# Patient Record
Sex: FEMALE
[De-identification: names, ages, dates, MRNs, and addresses within clinical notes are randomized per-mention and may not be internally consistent; named-entity substitution may affect disease eponyms.]

---

## 2018-01-16 ENCOUNTER — HOSPITAL ENCOUNTER (INPATIENT)
Dept: HOSPITAL 92 - ERS | Age: 58
LOS: 2 days | Discharge: PSYCHIATRIC OR SUBSTANCE USE FACILITY | DRG: 918 | End: 2018-01-18
Attending: INTERNAL MEDICINE | Admitting: INTERNAL MEDICINE
Payer: COMMERCIAL

## 2018-01-16 VITALS — BODY MASS INDEX: 28.5 KG/M2

## 2018-01-16 DIAGNOSIS — M32.9: ICD-10-CM

## 2018-01-16 DIAGNOSIS — I45.81: ICD-10-CM

## 2018-01-16 DIAGNOSIS — E03.9: ICD-10-CM

## 2018-01-16 DIAGNOSIS — B96.1: ICD-10-CM

## 2018-01-16 DIAGNOSIS — Z88.8: ICD-10-CM

## 2018-01-16 DIAGNOSIS — Z92.21: ICD-10-CM

## 2018-01-16 DIAGNOSIS — Z88.6: ICD-10-CM

## 2018-01-16 DIAGNOSIS — N39.0: ICD-10-CM

## 2018-01-16 DIAGNOSIS — Z85.3: ICD-10-CM

## 2018-01-16 DIAGNOSIS — F25.9: ICD-10-CM

## 2018-01-16 DIAGNOSIS — Z88.1: ICD-10-CM

## 2018-01-16 DIAGNOSIS — G89.4: ICD-10-CM

## 2018-01-16 DIAGNOSIS — F17.210: ICD-10-CM

## 2018-01-16 DIAGNOSIS — Z90.13: ICD-10-CM

## 2018-01-16 DIAGNOSIS — T40.2X2A: Primary | ICD-10-CM

## 2018-01-16 LAB
ALBUMIN SERPL BCG-MCNC: 4.2 G/DL (ref 3.5–5)
ALP SERPL-CCNC: 134 U/L (ref 40–150)
ALT SERPL W P-5'-P-CCNC: 10 U/L (ref 8–55)
ANALYZER IN CARDIO: (no result)
ANION GAP SERPL CALC-SCNC: 16 MMOL/L (ref 10–20)
ANISOCYTOSIS BLD QL SMEAR: (no result) (100X)
APAP SERPL-MCNC: (no result) MCG/ML (ref 10–30)
AST SERPL-CCNC: 11 U/L (ref 5–34)
BACTERIA UR QL AUTO: (no result) HPF
BASE EXCESS STD BLDA CALC-SCNC: 0.4 MEQ/L
BASOPHILS # BLD AUTO: 0.1 THOU/UL (ref 0–0.2)
BASOPHILS NFR BLD AUTO: 0.5 % (ref 0–1)
BILIRUB SERPL-MCNC: 0.6 MG/DL (ref 0.2–1.2)
BUN SERPL-MCNC: 11 MG/DL (ref 9.8–20.1)
CA-I BLDA-SCNC: 1.2 MMOL/L (ref 1.12–1.3)
CALCIUM SERPL-MCNC: 10.3 MG/DL (ref 7.8–10.44)
CHLORIDE SERPL-SCNC: 101 MMOL/L (ref 98–107)
CO2 SERPL-SCNC: 22 MMOL/L (ref 22–29)
CREAT CL PREDICTED SERPL C-G-VRATE: 0 ML/MIN (ref 70–130)
CRYSTAL-AUWI FLAG: 1.3 (ref 0–15)
CRYSTALS URNS QL MICRO: (no result) HPF
DRUG SCREEN CUTOFF: (no result)
EOSINOPHIL # BLD AUTO: 0.7 THOU/UL (ref 0–0.7)
EOSINOPHIL NFR BLD AUTO: 5.5 % (ref 0–10)
GLOBULIN SER CALC-MCNC: 3.3 G/DL (ref 2.4–3.5)
GLUCOSE SERPL-MCNC: 102 MG/DL (ref 70–105)
HCO3 BLDA-SCNC: 24 MEQ/L (ref 22–26)
HCT VFR BLDA CALC: 39.3 % (ref 36–47)
HEV IGM SER QL: 7 (ref 0–7.99)
HGB BLD-MCNC: 13.5 G/DL (ref 12–16)
HGB BLDA-MCNC: 11.7 G/DL (ref 12–16)
HYALINE CASTS #/AREA URNS LPF: (no result) LPF
LIPASE SERPL-CCNC: 7 U/L (ref 8–78)
LYMPHOCYTES # BLD: 3 THOU/UL (ref 1.2–3.4)
LYMPHOCYTES NFR BLD AUTO: 24.4 % (ref 21–51)
MCH RBC QN AUTO: 24.4 PG (ref 27–31)
MCV RBC AUTO: 74.4 FL (ref 81–99)
MDIFF COMPLETE?: YES
MEDTOX CONTROL LINE VALID?: (no result)
MEDTOX READER #: (no result)
MICROCYTES BLD QL SMEAR: (no result) (100X)
MONOCYTES # BLD AUTO: 0.8 THOU/UL (ref 0.11–0.59)
MONOCYTES NFR BLD AUTO: 6.4 % (ref 0–10)
NEUTROPHILS # BLD AUTO: 7.9 THOU/UL (ref 1.4–6.5)
NEUTROPHILS NFR BLD AUTO: 63.4 % (ref 42–75)
O2 A-A PPRESDIFF RESPIRATORY: 2.1 MM[HG] (ref 0–20)
OVALOCYTES BLD QL SMEAR: (no result) (100X)
PATHC CAST-AUWI FLAG: 5.14 (ref 0–2.49)
PCO2 BLDA: 35.3 MMHG (ref 35–45)
PH BLDA: 7.45 [PH] (ref 7.35–7.45)
PLATELET # BLD AUTO: 365 THOU/UL (ref 130–400)
PLATELET BLD QL SMEAR: (no result)
PO2 BLDA: 103.5 MMHG (ref 80–100)
POTASSIUM SERPL-SCNC: 3.8 MMOL/L (ref 3.5–5.1)
PROT UR STRIP.AUTO-MCNC: 30 MG/DL
RBC # BLD AUTO: 5.53 MILL/UL (ref 4.2–5.4)
RBC UR QL AUTO: (no result) HPF (ref 0–3)
SALICYLATES SERPL-MCNC: (no result) MG/DL (ref 15–30)
SODIUM SERPL-SCNC: 135 MMOL/L (ref 136–145)
SP GR UR STRIP: 1.03 (ref 1–1.04)
SPECIMEN DRAWN FROM PATIENT: (no result)
SPERM-AUWI FLAG: 0 (ref 0–9.9)
TARGETS BLD QL SMEAR: (no result) (100X)
TROPONIN I SERPL DL<=0.01 NG/ML-MCNC: (no result) NG/ML (ref ?–0.03)
TROPONIN I SERPL DL<=0.01 NG/ML-MCNC: (no result) NG/ML (ref ?–0.03)
WBC # BLD AUTO: 12.5 THOU/UL (ref 4.8–10.8)
WBC UR QL AUTO: (no result) HPF (ref 0–3)
YEAST-AUWI FLAG: 0 (ref 0–25)

## 2018-01-16 PROCEDURE — 83690 ASSAY OF LIPASE: CPT

## 2018-01-16 PROCEDURE — 80307 DRUG TEST PRSMV CHEM ANLYZR: CPT

## 2018-01-16 PROCEDURE — 80048 BASIC METABOLIC PNL TOTAL CA: CPT

## 2018-01-16 PROCEDURE — 84484 ASSAY OF TROPONIN QUANT: CPT

## 2018-01-16 PROCEDURE — 87186 SC STD MICRODIL/AGAR DIL: CPT

## 2018-01-16 PROCEDURE — 87077 CULTURE AEROBIC IDENTIFY: CPT

## 2018-01-16 PROCEDURE — 87086 URINE CULTURE/COLONY COUNT: CPT

## 2018-01-16 PROCEDURE — 96360 HYDRATION IV INFUSION INIT: CPT

## 2018-01-16 PROCEDURE — 80306 DRUG TEST PRSMV INSTRMNT: CPT

## 2018-01-16 PROCEDURE — 80053 COMPREHEN METABOLIC PANEL: CPT

## 2018-01-16 PROCEDURE — 85025 COMPLETE CBC W/AUTO DIFF WBC: CPT

## 2018-01-16 PROCEDURE — S0028 INJECTION, FAMOTIDINE, 20 MG: HCPCS

## 2018-01-16 PROCEDURE — 82805 BLOOD GASES W/O2 SATURATION: CPT

## 2018-01-16 PROCEDURE — 82140 ASSAY OF AMMONIA: CPT

## 2018-01-16 PROCEDURE — 36415 COLL VENOUS BLD VENIPUNCTURE: CPT

## 2018-01-16 PROCEDURE — 87149 DNA/RNA DIRECT PROBE: CPT

## 2018-01-16 PROCEDURE — 87040 BLOOD CULTURE FOR BACTERIA: CPT

## 2018-01-16 PROCEDURE — 51701 INSERT BLADDER CATHETER: CPT

## 2018-01-16 PROCEDURE — 81003 URINALYSIS AUTO W/O SCOPE: CPT

## 2018-01-16 PROCEDURE — 70450 CT HEAD/BRAIN W/O DYE: CPT

## 2018-01-16 PROCEDURE — 93005 ELECTROCARDIOGRAM TRACING: CPT

## 2018-01-16 PROCEDURE — 84443 ASSAY THYROID STIM HORMONE: CPT

## 2018-01-16 PROCEDURE — 71045 X-RAY EXAM CHEST 1 VIEW: CPT

## 2018-01-16 PROCEDURE — A4216 STERILE WATER/SALINE, 10 ML: HCPCS

## 2018-01-16 PROCEDURE — 81015 MICROSCOPIC EXAM OF URINE: CPT

## 2018-01-16 PROCEDURE — 74018 RADEX ABDOMEN 1 VIEW: CPT

## 2018-01-16 PROCEDURE — 93010 ELECTROCARDIOGRAM REPORT: CPT

## 2018-01-16 RX ADMIN — FAMOTIDINE SCH MG: 10 INJECTION, SOLUTION INTRAVENOUS at 20:17

## 2018-01-16 RX ADMIN — Medication SCH: at 20:22

## 2018-01-16 NOTE — RAD
ABDOMINAL RADIOGRAPH

SINGLE VIEW

1/16/18

 

CLINICAL HISTORY: 

Altered mental status. 

 

FINDINGS:  

There is distended air filled bowel of the imaged abdomen. Mild bowel gas is seen within the pelvis. 
Rounded density of the pelvis likely relates to a moderately distended urinary bladder, although is n
onspecific. There are punctate densities of the pelvis indicating phleboliths. Metallic clips are see
n at the right upper quadrant. No acute findings of the osseous structures are evident. 

 

IMPRESSION:  

Nonspecific gas filled bowel of the abdomen. Correlate clinically and as necessary, imaging followup 
may be obtained. 

 

POS: [facility]

## 2018-01-16 NOTE — CT
NONCONTRAST CT HEAD

1/16/18

 

HISTORY: 

Altered mental status. 

 

COMPARISON:  

4/3/14.

 

FINDINGS:  

There is no evidence of hemorrhage, acute infarction, mass effect, or midline shift. Ventricular syst
em is normal in size, shape, and position. 

 

There is mucosal thickening now present in the right sphenoid sinus and to a lesser degree the right 
maxillary antrum with minimal mucosal thickening in a few ethmoidal air cells bilaterally. The mastoi
d air cells are clear. Calvarial structures remain intact.

 

IMPRESSION:  

1.      No acute intracranial abnormalities demonstrated.

2.      Sinus disease.

 

POS: [facility]

## 2018-01-16 NOTE — HP
DATE OF ADMISSION:  2018

 

PRIMARY CARE PHYSICIAN:  Halie Gupta MD

 

CHIEF COMPLAINT:  Somnolence.

 

HISTORY OF PRESENT ILLNESS:  Ms. Chambers is a 57-year-old female with past medical history of hypothy
roidism, and breast cancer, status post chemotherapy and Surgery as well as history of bipolar disord
er and depression, who presented to the emergency room for somnolence.  History is mainly obtained by
 the record review and discussion with the ER physician.  The patient is very somnolent and altered a
nd is not able to provide any history and there is no family member available at bedside.

 

According to the ER physician, the patient was brought in by EMS with the , called 911.  He re
ported earlier when he was present in the ER that recently she had prescriptions filled for hydrocodo
ne on 2017 and now they are all gone.  There is a possibility that the patient took a lot of pa
in medications today.  When the  returned home from work today, he found the patient on the fl
oor.  Otherwise, there is no mention of any recent illnesses.  The patient has history of suicidal at
[facility]empt in 2016, but she denies any suicidal ideation when woke her up today.

 

Upon presentation to the emergency room, she was hemodynamically stable.  Her oxygen saturation was 9
9% on room air, her blood pressure was 154/86.  The emergency room physician contacted [facility] because of possibility of acetaminophen toxicity.  She received 3 bags of Mucomyst, but l
ater her toxicology, report came back with Tylenol level less than 6, so it was discontinued.  Her ur
ine drug screen did test positive for propoxyphene, tricyclics, benzodiazepines, and cocaine.  The pa
tient denied any cocaine use to the ER physician and myself.

 

Nevertheless, the patient is more awake now, but still somewhat confused.  She is able to protect her
 airways and is now being admitted for multiple drug overdose, unsure if it is intentional or acciden
latesha.  The EKG done in the emergency room shows QT interval less than 500 milliseconds and shows comfort
l sinus rhythm.  Because of presentation with altered mental status, she did undergo a CT scan of the
 brain which is unremarkable.  She also had an abdominal x-ray and a chest x-ray, which were also unr
emarkable.  She is currently hemodynamically stable and is admitted to telemetry for altered mental s
tatus and drug overdose.

 

PAST MEDICAL HISTORY:  As per the medical records;

1.  SLE.

2.  Hypothyroidism.

3.  Breast carcinoma, status post chemotherapy and mastectomy.

4.  Chronic pain syndrome.

5.  Anxiety.

6.  Bipolar.

7.  Schizoaffective disorder.

8.  Depression.

 

PAST SURGICAL HISTORY:

1.  Appendectomy.

2.  Bilateral mastectomy with breast reconstruction.

3.  Hysterectomy.

4.  Cholecystectomy.

5.   section.

 

ALLERGIES:  Listed as;

1.  CEPHALOSPORINS.

2.  NUBAIN.

3.  REGLAN.

4.  TORADOL.

5.  ZOFRAN.

 

FAMILY HISTORY:  Unable to obtain as the patient is very altered at this time.

 

SOCIAL HISTORY:  The patient is  and lives with her family.  She smokes about half to 1 pack o
f cigarettes per day.  No history of drug or alcohol abuse.  Chronic pain medication, narcotic use.

 

CURRENT MEDICATIONS:  Unable to obtain.

 

There is no family in the room and the patient is not able to tell me which medications she takes.

 

REVIEW OF SYSTEMS:  Unable to obtain because of the patient's altered mental status.

 

LABORATORY AND DIAGNOSTIC DATA:  CBC shows WBCs at 12.5 with 63% neutrophils, otherwise unremarkable.
  ABG shows pH is 7.45, pCO2 of 35, pO2 of 103.  Serum chemistry showed Sodium of 135.  TSH 4.6.  Amm
onia 35.  Liver enzymes unremarkable.  Urinalysis showed positive nitrite and +4 bacteria.  Urine jacob
g screen as above.  Her serum salicylate, alcohol, and acetaminophen levels are unremarkable.  Chest 
x-ray by my review has no evidence of pleural effusion, edema, or infiltrate.  Abdominal x-ray by my 
review as nonspecific bowel gas pattern without any evidence of obstruction.  CT scan of the brain by
 my review shows no acute intracranial abnormalities.  A 12-lead EKG shows normal sinus rhythm with Q
TC of less than 500 milliseconds.

 

PHYSICAL EXAMINATION:

VITAL SIGNS:  Upon presentation, blood pressure 154/86, pulse of 75, respirations 18, saturating 99% 
on room air, temperature 97.8.

GENERAL:  She is very somnolent and opens her eyes to verbal stimulus and follow simple commands, but
 falls back asleep in few seconds.  She is not able to discuss much of the history and seems very con
fused and giving me the wrong answers.  She does not appear to have any acute distress.

HEENT:  Mucous membrane is dry.  Pupils are dilated and reactive to light, equal bilaterally.  Head i
s normocephalic, atraumatic.

NECK:  Supple without any lymphadenopathy, JVD, or bruit.

CHEST:  Clear to auscultation without any wheezing, rales, or rhonchi.  Rate and rhythm is regular wi
thout any murmur, rubs, or gallops.

ABDOMEN:  Soft, nontender, nondistended with positive bowel sounds.

EXTREMITIES:  Free of any cyanosis, clubbing, or edema.

NEUROLOGIC:  She is moving all 4 extremities freely.  No cranial affected.  No focal neurological def
icits.  She is oriented to self only.

PSYCHIATRIC:  Difficult to ascertain because of altered mental status.

SKIN:  Free of any rashes or bruises, feels warm and dry to touch.

VASCULAR:  +2 pedal pulses felt bilaterally.

 

IMPRESSION AND PLAN:

1.  Altered mental status.  This is secondary to drug overdose.  She will be admitted to the telemetr
y unit.  She is currently hemodynamically stable.  She has received multiple bags of Acetadote.  Her 
Tylenol levels are within normal limits, so we will discontinue those for now.  Management currently 
is largely symptomatic.  At this time, the patient denies any suicidal ideation.  We will reevaluate 
this in the morning.  She is able to protect her airways at this time.  We will repeat the EKG in the
 morning.  We will try to get more information from her family once they are here in the morning.

2.  Continue IV fluids at 100 mL per hour for now.

3.  History of systemic lupus erythematosus.  It is unclear if the patient is still on chronic steroi
ds.  We will reconsult her home medications.

4.  Urinary tract infection.  The patient does have leukocytosis and urinalysis suggest urinary tract
 infection.  We will start her on empiric IV antibiotic and send urine for culture.

5.  Monitor electrolytes closely.

6.  Drug overdose.  We will revisit with the patient if this was suicidal ideation or accidental over
dose.  We will obtain [facility] evaluation if this was suicidal ideation or not.

7.  History of depression, schizoaffective disorder, and anxiety.  We will avoid any sort of narcotic
 sedative, antianxiety, or antipsychotic medications for now.

8.  Code status:  FULL, presumed based on her prior admissions to the hospital.

9.  Deep venous thrombosis and gastrointestinal prophylaxis.

 

DISPOSITION:  Ms. Chambers is being admitted to the hospital for drug overdose and altered mental stat
us.  Further management will depend upon her clinical course.  Estimated length of stay at this time 
is 2 to 3 midnights.

## 2018-01-16 NOTE — RAD
PORTABLE AP CHEST X-RAY:

1/16/18

 

HISTORY: 

Altered mental status.

 

COMPARISON:  

3/9/16.

 

FINDINGS:  

Endotracheal tube and nasogastric tube has been removed since the prior exam. There is also improved 
aeration of the lungs bilaterally and the lungs are clear on today's exam. The cardiac silhouette and
 pulmonary vasculature are within normal limits for the portable technique of the study. 

 

IMPRESSION:  

No acute cardiopulmonary process. 

 

 

POS: [facility]

## 2018-01-17 LAB
ANION GAP SERPL CALC-SCNC: 13 MMOL/L (ref 10–20)
BASOPHILS # BLD AUTO: 0 THOU/UL (ref 0–0.2)
BASOPHILS NFR BLD AUTO: 0.4 % (ref 0–1)
BUN SERPL-MCNC: 11 MG/DL (ref 9.8–20.1)
CALCIUM SERPL-MCNC: 8.8 MG/DL (ref 7.8–10.44)
CHLORIDE SERPL-SCNC: 104 MMOL/L (ref 98–107)
CO2 SERPL-SCNC: 22 MMOL/L (ref 22–29)
CREAT CL PREDICTED SERPL C-G-VRATE: 108 ML/MIN (ref 70–130)
EOSINOPHIL # BLD AUTO: 0.7 THOU/UL (ref 0–0.7)
EOSINOPHIL NFR BLD AUTO: 5.8 % (ref 0–10)
GLUCOSE SERPL-MCNC: 90 MG/DL (ref 70–105)
HGB BLD-MCNC: 11.3 G/DL (ref 12–16)
LYMPHOCYTES # BLD: 3.1 THOU/UL (ref 1.2–3.4)
LYMPHOCYTES NFR BLD AUTO: 24.4 % (ref 21–51)
MCH RBC QN AUTO: 23.9 PG (ref 27–31)
MCV RBC AUTO: 74.5 FL (ref 81–99)
MONOCYTES # BLD AUTO: 1.1 THOU/UL (ref 0.11–0.59)
MONOCYTES NFR BLD AUTO: 8.6 % (ref 0–10)
NEUTROPHILS # BLD AUTO: 7.7 THOU/UL (ref 1.4–6.5)
NEUTROPHILS NFR BLD AUTO: 60.8 % (ref 42–75)
PLATELET # BLD AUTO: 331 THOU/UL (ref 130–400)
POTASSIUM SERPL-SCNC: 4.1 MMOL/L (ref 3.5–5.1)
RBC # BLD AUTO: 4.74 MILL/UL (ref 4.2–5.4)
SODIUM SERPL-SCNC: 135 MMOL/L (ref 136–145)
TROPONIN I SERPL DL<=0.01 NG/ML-MCNC: 0.01 NG/ML (ref ?–0.03)
WBC # BLD AUTO: 12.7 THOU/UL (ref 4.8–10.8)

## 2018-01-17 RX ADMIN — Medication SCH: at 12:43

## 2018-01-17 RX ADMIN — FAMOTIDINE SCH MG: 10 INJECTION, SOLUTION INTRAVENOUS at 12:42

## 2018-01-17 RX ADMIN — Medication SCH: at 21:00

## 2018-01-17 NOTE — PDOC.PN
- Subjective


Encounter Start Date: 01/17/18


Encounter Start Time: 12:24


Subjective: feels Ok. wants to eat.


-: reports Suicidal attempt yesterday 


-: took a lot of prescription meds





- Objective


MAR Reviewed: Yes


Vital Signs & Weight: 


 Vital Signs (12 hours)











  Temp Pulse Resp BP BP Pulse Ox


 


 01/17/18 09:02  96.8 F L  68  16   179/90 H  96


 


 01/17/18 04:03     164/84 H  


 


 01/17/18 04:00  97.9 F  69  20   167/84 H  100








 Weight











Weight                         177 lb 1.6 oz














Result Diagrams: 


 01/17/18 04:11





 01/17/18 04:11


Radiology Reviewed by me: Yes


EKG Reviewed by me: Yes (Qt 44 s)





Phys Exam





- Physical Examination


Constitutional: NAD


HEENT: PERRLA, moist MMs, sclera anicteric, oral pharynx no lesions


Neck: no nodes, no JVD, supple, full ROM


Respiratory: no wheezing, no rales, no rhonchi, clear to auscultation bilateral


Cardiovascular: RRR, no significant murmur, no rub, gallop


Gastrointestinal: soft, non-tender, no distention, positive bowel sounds


Musculoskeletal: no edema, pulses present


Neurological: non-focal, normal sensation, moves all 4 limbs


Psychiatric: normal affect, A&O x 3


Skin: no rash





Dx/Plan


(1) Drug overdose


Code(s): T50.901A - POISONING BY UNSP DRUG/MEDS/BIOL SUBST, ACCIDENTAL, INIT   

Status: Acute   





(2) Suicidal ideation


Code(s): R45.851 - SUICIDAL IDEATIONS   Status: Acute   





(3) UTI (urinary tract infection)


Status: Acute   





(4) Schizophrenia


Code(s): F20.9 - SCHIZOPHRENIA, UNSPECIFIED   Status: Chronic   





- Plan


continue antibiotics, out of bed/ambulate, DVT proph w/SCDs


paased bedside swollow.start diet.


-: [facility] consult for SI.


-: ABx for UTI.follow Cx.


-: hemodynamically stable & medically clear for [facility]


-: transfet to medical.QT not prolonged on EKG





* .








Review of Systems





- Review of Systems


Constitutional: weakness, malaise.  negative: fever, chills, sweats, other


Respiratory: negative: Cough, Dry, Shortness of Breath, Hemoptysis, SOB with 

Excertion, Pleuritic Pain, Sputum, Wheezing


Cardiovascular: negative: chest pain, palpitations, orthopnea, paroxysmal 

nocturnal dyspnea, edema, light headedness, other


Gastrointestinal: negative: Nausea, Vomiting, Abdominal Pain, Diarrhea, 

Constipation, Melena, Hematochezia, Other


Genitourinary: negative: Dysuria, Frequency, Incontinence, Hematuria, Retention

, Other


Musculoskeletal: negative: Neck Pain, Shoulder Pain, Arm Pain, Back Pain, Hand 

Pain, Leg Pain, Foot Pain, Other


Neurological: negative: Weakness, Numbness, Incoordination, Change in Speech, 

Confusion, Seizures, Other





- Medications/Allergies


Allergies/Adverse Reactions: 


 Allergies











Allergy/AdvReac Type Severity Reaction Status Date / Time


 


Cephalosporins Allergy   Verified 03/07/16 21:56


 


ketorolac tromethamine Allergy   Verified 03/07/16 21:56





[From Toradol]     


 


metoclopramide HCl Allergy   Verified 03/07/16 21:56





[From Reglan]     


 


nalbuphine HCl [From Nubain] Allergy   Verified 03/07/16 21:56


 


ondansetron HCl Allergy   Verified 03/07/16 21:56





[From Zofran (as     





hydrochloride)]     











Medications: 


 Current Medications





Al Hydroxide/Mg Hydroxide (Maalox)  30 ml PO Q6H PRN


   PRN Reason: Heartburn  or Indigestion


Benzonatate (Tessalon)  100 mg PO Q4H PRN


   PRN Reason: Cough


Bisacodyl (Dulcolax)  10 mg PO DAILYPRN PRN


   PRN Reason: Constipation


Clonidine (Catapres)  0.1 mg PO Q4H PRN


   PRN Reason: Systolic BP > 160


   Last Admin: 01/17/18 04:03 Dose:  0.1 mg


Enoxaparin Sodium (Lovenox)  40 mg SC 0900 [facility]


Famotidine (Pepcid)  20 mg SLOW IVP Q12HR [facility]


   Last Admin: 01/16/18 20:17 Dose:  20 mg


Guaifenesin (Robitussin Sf)  200 mg PO Q4H PRN


   PRN Reason: Cough


Hydralazine HCl (Apresoline)  10 mg SLOW IVP Q4H PRN


   PRN Reason: Systolic BP > 180


Sodium Chloride (Normal Saline 0.9%)  1,000 mls @ 100 mls/hr IV .Q10H [facility]


   Stop: 01/17/18 16:02


   Last Admin: 01/17/18 06:58 Dose:  1,000 mls


Levofloxacin 500 mg/ Device  100 mls @ 100 mls/hr IVPB Q24HR [facility]


   Last Admin: 01/16/18 20:20 Dose:  100 mls


Loratadine (Claritin)  10 mg PO DAILYPRN PRN


   PRN Reason: Sinus Symptoms


Nitroglycerin (Nitrostat)  0.4 mg SL Q5MIN PRN


   PRN Reason: Chest Pain


Senna (Senokot)  2 tab PO HSPRN PRN


   PRN Reason: Constipation


Sodium Chloride (Flush - Normal Saline)  10 ml IVF Q12HR [facility]


   Last Admin: 01/16/18 20:22 Dose:  Not Given


Sodium Chloride (Flush - Normal Saline)  10 ml IVF PRN PRN


   PRN Reason: Saline Flush

## 2018-01-18 VITALS — SYSTOLIC BLOOD PRESSURE: 115 MMHG | DIASTOLIC BLOOD PRESSURE: 79 MMHG | TEMPERATURE: 98.5 F

## 2018-01-18 RX ADMIN — Medication SCH: at 08:07

## 2018-01-18 NOTE — PDOC.PN
- Subjective


Encounter Start Date: 01/18/18


Encounter Start Time: 13:59


Subjective: feels better. 


-: pt updated about need for inpt psych trasnfer-agreeable w some reluctance





- Objective


MAR Reviewed: Yes


Vital Signs & Weight: 


 Vital Signs (12 hours)











  Temp Pulse Resp BP Pulse Ox


 


 01/18/18 13:25  98.5 F  80  20  115/79  95


 


 01/18/18 08:00  98.1 F  77  16  


 


 01/18/18 07:32  98.1 F  77  16  143/95 H  95


 


 01/18/18 06:34     154/91 H 


 


 01/18/18 04:00  97.9 F  68  20  168/105 H  97








 Weight











Weight                         177 lb 1.6 oz














I&O: 


 











 01/17/18 01/18/18 01/19/18





 06:59 06:59 06:59


 


Intake Total  500 


 


Balance  500 











Result Diagrams: 


 01/17/18 04:11





 01/17/18 04:11


Additional Labs: 





Microbiology





01/16/18 16:52   Urine Straight Catheter   Urine Culture - Preliminary


                              Klebsiella pneumoniae ssp pneu


                              Gram Negative Darron#2


01/16/18 16:52   Urine Straight Catheter   Urine Culture - Preliminary


                              Klebsiella pneumoniae ssp pneu


                              Gram Negative Darron#2


01/16/18 16:42   Venous blood - Right Hand   Blood Culture - Preliminary


                              Specimen has been received and culture in 

progress.


                              No Growth to date.


01/16/18 16:42   Venous blood - Right Hand   Blood Culture - Preliminary


                              Specimen has been received and culture in 

progress.


                              No Growth to date.


01/16/18 16:42   Venous blood - Right Hand   Blood Culture - Preliminary


                              NO GROWTH AT 48 HOURS


01/16/18 16:42   Venous blood - Right Hand   Blood Culture - Preliminary


                              Coagulase Neg Staphylococcus


                              Coagulase Neg Staphylococcus#2





 Laboratory Tests











  01/16/18 01/16/18 01/16/18





  16:42 16:42 19:21


 


Ammonia  35  


 


Troponin I    Less than  0.010


 


TSH 3rd Generation   4.6645 














  01/16/18 01/17/18





  22:17 01:25


 


Ammonia  


 


Troponin I  Less than  0.010  0.010


 


TSH 3rd Generation  














Phys Exam





- Physical Examination


Constitutional: NAD


HEENT: PERRLA, moist MMs, sclera anicteric, oral pharynx no lesions


Neck: no nodes, no JVD, supple, full ROM


Respiratory: no wheezing, no rales, no rhonchi, clear to auscultation bilateral


Cardiovascular: RRR, no significant murmur, no rub, gallop


Gastrointestinal: soft, non-tender, no distention, positive bowel sounds


Musculoskeletal: no edema, pulses present


Neurological: non-focal, normal sensation, moves all 4 limbs


Psychiatric: normal affect, A&O x 3


Skin: no rash





Dx/Plan


(1) Drug overdose


Code(s): T50.901A - POISONING BY UNSP DRUG/MEDS/BIOL SUBST, ACCIDENTAL, INIT   

Status: Acute   





(2) Suicidal ideation


Code(s): R45.851 - SUICIDAL IDEATIONS   Status: Acute   





(3) UTI (urinary tract infection)


Status: Acute   





(4) Schizophrenia


Code(s): F20.9 - SCHIZOPHRENIA, UNSPECIFIED   Status: Chronic   





- Plan


OK to DC to Inpt psych.Car ediscussed with accepting MD


-: Multiple phone calls made to  without luck.


-: called her friend edgardo as per Pt's instructions & left message for husban


-: OK to DC.[facility] ABx for klebsiella UTI 





* .








Review of Systems





- Review of Systems


Constitutional: negative: fever, chills, sweats, weakness, malaise, other


Respiratory: negative: Cough, Dry, Shortness of Breath, Hemoptysis, SOB with 

Excertion, Pleuritic Pain, Sputum, Wheezing


Cardiovascular: negative: chest pain, palpitations, orthopnea, paroxysmal 

nocturnal dyspnea, edema, light headedness, other


Gastrointestinal: negative: Nausea, Vomiting, Abdominal Pain, Diarrhea, 

Constipation, Melena, Hematochezia, Other


Genitourinary: negative: Dysuria, Frequency, Incontinence, Hematuria, Retention

, Other


Musculoskeletal: negative: Neck Pain, Shoulder Pain, Arm Pain, Back Pain, Hand 

Pain, Leg Pain, Foot Pain, Other


Neurological: negative: Weakness, Numbness, Incoordination, Change in Speech, 

Confusion, Seizures, Other





- Medications/Allergies


Allergies/Adverse Reactions: 


 Allergies











Allergy/AdvReac Type Severity Reaction Status Date / Time


 


Cephalosporins Allergy   Verified 03/07/16 21:56


 


ketorolac tromethamine Allergy   Verified 03/07/16 21:56





[From Toradol]     


 


metoclopramide HCl Allergy   Verified 03/07/16 21:56





[From Reglan]     


 


nalbuphine HCl [From Nubain] Allergy   Verified 03/07/16 21:56


 


ondansetron HCl Allergy   Verified 03/07/16 21:56





[From Zofran (as     





hydrochloride)]     











Medications: 


 Current Medications





Al Hydroxide/Mg Hydroxide (Maalox)  30 ml PO Q6H PRN


   PRN Reason: Heartburn  or Indigestion


Amlodipine Besylate (Norvasc)  10 mg PO DAILY [facility]


   Last Admin: 01/18/18 08:08 Dose:  10 mg


Benzonatate (Tessalon)  100 mg PO Q4H PRN


   PRN Reason: Cough


Bisacodyl (Dulcolax)  10 mg PO DAILYPRN PRN


   PRN Reason: Constipation


Clonidine (Catapres)  0.1 mg PO Q4H PRN


   PRN Reason: Systolic BP > 160


   Last Admin: 01/17/18 23:31 Dose:  0.1 mg


Enoxaparin Sodium (Lovenox)  40 mg SC [facility]00 [facility]


   Last Admin: 01/18/18 08:08 Dose:  40 mg


Famotidine (Pepcid)  20 mg PO BID [facility]


   Last Admin: 01/18/18 08:08 Dose:  20 mg


Guaifenesin (Robitussin Sf)  200 mg PO Q4H PRN


   PRN Reason: Cough


Hydralazine HCl (Apresoline)  10 mg SLOW IVP Q4H PRN


   PRN Reason: Systolic BP > 180


Levofloxacin (Levaquin)  500 mg PO 2100 [facility]


   Last Admin: 01/17/18 21:09 Dose:  500 mg


Loratadine (Claritin)  10 mg PO DAILYPRN PRN


   PRN Reason: Sinus Symptoms


Nitroglycerin (Nitrostat)  0.4 mg SL Q5MIN PRN


   PRN Reason: Chest Pain


Senna (Senokot)  2 tab PO HSPRN PRN


   PRN Reason: Constipation


Sodium Chloride (Flush - Normal Saline)  10 ml IVF Q12HR [facility]


   Last Admin: 01/18/18 08:07 Dose:  Not Given


Sodium Chloride (Flush - Normal Saline)  10 ml IVF PRN PRN


   PRN Reason: Saline Flush

## 2018-01-18 NOTE — EKG
Test Reason : 

Blood Pressure : ***/*** mmHG

Vent. Rate : 070 BPM     Atrial Rate : 070 BPM

   P-R Int : 156 ms          QRS Dur : 092 ms

    QT Int : 488 ms       P-R-T Axes : 061 -24 000 degrees

   QTc Int : 527 ms

 

Normal sinus rhythm

Nonspecific ST and T wave abnormality

Prolonged QT

Abnormal ECG

When compared with ECG of 16-JAN-2018 16:01, (Unconfirmed)

Nonspecific T wave abnormality now evident in Inferior leads

QT has lengthened

Confirmed by GAYLE REYES,  SPeggy (4) on 1/18/2018 12:51:56 AM

 

Referred By:  LEODAN           Confirmed By:DR. CARA ARAUJO MD

## 2018-01-18 NOTE — DIS
DATE OF ADMISSION:  01/16/2018

 

DATE OF DISCHARGE:  01/18/2018

 

DISCHARGE DISPOSITION:  Inpatient psychiatric hospital.

 

DISCHARGE MEDICATIONS:  Levofloxacin 500 mg p.o. daily for 5 more days and Norvasc 10 mg daily.

 

DISCHARGE DIAGNOSES:

1.  Suicidal ideation and attempt.

2.  Drug overdose.

3.  Urinary tract infection.

4.  Schizoaffective disorder.

 

PRIMARY CARE PHYSICIAN:  Halie Gupta M.D.

 

PROCEDURES DONE IN THE HOSPITAL:

1.  CT scan of the brain, which was unremarkable.

2.  X-ray of the abdomen, which is negative for any bowel obstruction.

3.  Chest x-ray, which is negative for any pleural effusion, infiltrate, or edema.

 

HISTORY OF PRESENTING ILLNESS:  Ms. Chambers is a 57-year-old woman with [facility]:  who presented for altered men
latesha status.  Upon presentation, her  gave report that she might have taken too much of her anay
cotics.  She did test positive for tricyclic antidepressants along with propoxyphene, benzodiazepines
, and cocaine in her urine.  Alcohol, salicylate, and acetaminophen levels were essentially unremarka
ble.  Her EKG did show a prolonged QTc interval of 527 milliseconds and she was admitted with IV flui
ds on telemetry monitoring.  Please see admission history and physical dictated by myself for further
 details.

 

HOSPITAL COURSE:  Ms. Chambers had a quick turned around and by the next morning, she was awake, alert
, and oriented x3.  She did report that she was trying to take her own life and because of this, [facility]
 was consulted again.  They evaluated the patient and recommended inpatient psychiatric admission.  T
his was ranged in [place].  I have told the patient with regards to this and she is agreeable with marly
e reluctance.  I also discussed the care with her family members, who were there prior to the patient
's transfer.

 

She was found to have a urinalysis suggestive of urinary tract infections, so the cultures were obtai
[place].  Her urine culture presumptive is growing Klebsiella as well as a second gram-negative ruddy.  It 
is pansensitive except for intermediate sensitivity to nitrofurantoin.  Blood cultures negative x2 ex
cept for one out of two sets growing coag negative Staph, which is likely a contaminant.  She was sta
rted and treated with levofloxacin for the UTI, which she will complete in the outpatient setting.

 

She was seen and examined prior to discharge and was hemodynamically stable.  She is cleared medicall
y to be discharged to the inpatient psychiatric facility.  Please see hospitalist progress note from 
today's date for further detail including face-to-face interaction.

 

The patient is discharged to [facility] in [place].

## 2018-03-14 ENCOUNTER — HOSPITAL ENCOUNTER (EMERGENCY)
Dept: HOSPITAL 92 - ERS | Age: 58
Discharge: LEFT BEFORE DISCHARGE | End: 2018-03-14
Payer: COMMERCIAL

## 2018-03-14 DIAGNOSIS — Z53.21: Primary | ICD-10-CM
